# Patient Record
Sex: FEMALE | Race: BLACK OR AFRICAN AMERICAN | URBAN - METROPOLITAN AREA
[De-identification: names, ages, dates, MRNs, and addresses within clinical notes are randomized per-mention and may not be internally consistent; named-entity substitution may affect disease eponyms.]

---

## 2017-04-13 ENCOUNTER — OFFICE VISIT (OUTPATIENT)
Dept: PSYCHOLOGY | Facility: CLINIC | Age: 32
End: 2017-04-13
Payer: COMMERCIAL

## 2017-04-13 DIAGNOSIS — F33.41 RECURRENT MAJOR DEPRESSIVE DISORDER, IN PARTIAL REMISSION (H): ICD-10-CM

## 2017-04-13 DIAGNOSIS — F41.1 GAD (GENERALIZED ANXIETY DISORDER): Primary | ICD-10-CM

## 2017-04-13 PROCEDURE — 90834 PSYTX W PT 45 MINUTES: CPT | Performed by: MARRIAGE & FAMILY THERAPIST

## 2017-04-13 ASSESSMENT — ANXIETY QUESTIONNAIRES
6. BECOMING EASILY ANNOYED OR IRRITABLE: NOT AT ALL
1. FEELING NERVOUS, ANXIOUS, OR ON EDGE: SEVERAL DAYS
5. BEING SO RESTLESS THAT IT IS HARD TO SIT STILL: SEVERAL DAYS
2. NOT BEING ABLE TO STOP OR CONTROL WORRYING: NOT AT ALL
IF YOU CHECKED OFF ANY PROBLEMS ON THIS QUESTIONNAIRE, HOW DIFFICULT HAVE THESE PROBLEMS MADE IT FOR YOU TO DO YOUR WORK, TAKE CARE OF THINGS AT HOME, OR GET ALONG WITH OTHER PEOPLE: NOT DIFFICULT AT ALL
GAD7 TOTAL SCORE: 4
7. FEELING AFRAID AS IF SOMETHING AWFUL MIGHT HAPPEN: NOT AT ALL
3. WORRYING TOO MUCH ABOUT DIFFERENT THINGS: SEVERAL DAYS

## 2017-04-13 ASSESSMENT — PATIENT HEALTH QUESTIONNAIRE - PHQ9: 5. POOR APPETITE OR OVEREATING: SEVERAL DAYS

## 2017-04-13 NOTE — MR AVS SNAPSHOT
"                  MRN:[id number]                      After Visit Summary   2017    Antonieta Ferrari    MRN: [id number]           Visit Information        Provider Department      2017 12:30 PM ModestoquinnRaven cabello [facility] Generic      Your next 10 appointments already scheduled     May 02, 2017 10:30 AM CDT   Return Visit with Ravenchelly [facility] ([facility])    [address]  [facility] [address]   [phone]            May 16, 2017 10:30 AM CDT   Return Visit with Raven [facility] ([facility])    [address]  [facility] [address]   [phone]              MyChart Information     [employer] lets you send messages to your doctor, view your test results, renew your prescriptions, schedule appointments and more. To sign up, go to [email or web address] . Click on \"Log in\" on the left side of the screen, which will take you to the Welcome page. Then click on \"Sign up Now\" on the right side of the page.     You will be asked to enter the access code listed below, as well as some personal information. Please follow the directions to create your username and password.     Your access code is: [email or web address]  Expires: 2017  2:06 PM     Your access code will  in 90 days. If you need help or a new code, please call your [place] clinic or [phone].        Care EveryWhere ID     This is your Care EveryWhere ID. This could be used by other organizations to access your [place] medical records  [id number]        "

## 2017-04-13 NOTE — PROGRESS NOTES
Progress Note    Client Name: Antonieta Ferrari  Date: 4/13/2017         Service Type: Individual      Session Start Time: 4:00  Session End Time: 4:45      Session Length: 45     Session #: 11     Attendees: Client attended alone    Treatment Plan Last Reviewed: 4/13/2017  PHQ-9 / KENNEDY-7 : Each session     DATA      Progress Since Last Session (Related to Symptoms / Goals / Homework):   Symptoms: Stable    Homework: Partially completed      Episode of Care Goals: Satisfactory progress - ACTION (Actively working towards change); Intervened by reinforcing change plan / affirming steps taken     Current / Ongoing Stressors and Concerns:  Client is using pot again. She relapsed when she lost her job and is now smoking daily (at night only). She is lying to her mother and support group about it which makes her feel badly. Lying to herself. 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 May also need knee surgery but is waiting for MRI results. Has not seen parents, is being somewhat social. She had to quit working out which was the one thing that was helping her stay sober.      Treatment Objective(s) Addressed in This Session:   attend and participate in social or recreational activities weekly.   Attend AA 2 times weekly  Exercise regularly  Recommit to sobriety and get a sponsor. Consider outpatient tx program in [place].   Use tools in the evening to avoid using: yoga, hot bath/shower, be social, read, calming things.     Intervention:   Solution Focused: Follow guidelines for after care including AA        ASSESSMENT: Current Emotional / Mental Status (status of significant symptoms):   Risk status (Self / Other harm or suicidal ideation)   Client denies current fears or concerns for personal safety.   Client denies current or recent suicidal ideation or behaviors.   Client  denies current or recent homicidal ideation or behaviors.   Client denies current or recent self injurious behavior or ideation.   Client denies other safety concerns.   A safety and risk management plan has not been developed at this time, however client was given the after-hours number should there be a change in any of these risk factors.     Appearance:   Appropriate    Eye Contact:   Fair    Psychomotor Behavior: Normal    Attitude:   Cooperative    Orientation:   All   Speech    Rate / Production: Normal     Volume:  Normal    Mood:    Normal   Affect:    Appropriate    Thought Content:  Clear    Thought Form:  Coherent  Logical    Insight:    Good      Medication Review:   No current psychiatric medications prescribed      Medication Compliance:   NA     Changes in Health Issues:   None reported     Chemical Use Review:   Substance Use: Chemical use reviewed, no active concerns identified      Tobacco Use: No current tobacco use.       Collateral Reports Completed:   Not Applicable    PLAN: Recommit to sobriety and get a sponsor. Consider outpatient tx program in [place]. Use tools in the evening to avoid using: yoga, hot bath/shower, be social, read, calming things. Continue job search and follow up with MD on knee problem. Exercise in the meantime as best she can.      Raven Colunga                                                         ________________________________________________________________________    Treatment Plan    Client's Name: Antonieta Ferrari  YOB: 1985    Date: 4/13/2017    DSM-V Diagnoses: 296.32 Major Depressive Disorder, Recurrent Episode, Moderate _ and With anxious distress or 300.02 (F41.1) Generalized Anxiety Disorder  Psychosocial / Contextual Factors: Cannibis dependence recovery  WHODAS: 30    Referral / Collaboration:  Referral to another professional/service is not indicated at this time.     Anticipated number of session or this episode of care:  10      MeasurableTreatment Goal(s) related to diagnosis / functional impairment(s)  Goal 1: Client will lower her GAD7 and PHQ9 scores to 4 or below    I will know I've met my goal when I'm not smoking pot daily.      Objective #A (Client Action)    Client will follow treatment after care guidelines.  Status: Restarted - Date: 4/13/2017     Intervention(s)  Therapist will make referrals to Outpatient CD Tx and encourage Antonieta to get and use a sponsor.    Objective #B  Client will stay sober.  Status: Restarted - Date: 4/13/2017     Intervention(s)  Therapist will encourage sobriety..    Objective #C  Client will be social on a regular basis..  Status: New - Date: 4/13/2017    Intervention(s)  Therapist will encourage Antonieta to work on increasing support..        Client has reviewed and agreed to the above plan.      Raven Colunga  April 13, 2017

## 2017-04-14 ASSESSMENT — ANXIETY QUESTIONNAIRES: GAD7 TOTAL SCORE: 4

## 2017-04-14 ASSESSMENT — PATIENT HEALTH QUESTIONNAIRE - PHQ9: SUM OF ALL RESPONSES TO PHQ QUESTIONS 1-9: 7

## 2017-05-02 ENCOUNTER — OFFICE VISIT (OUTPATIENT)
Dept: PSYCHOLOGY | Facility: CLINIC | Age: 32
End: 2017-05-02
Payer: COMMERCIAL

## 2017-05-02 DIAGNOSIS — F41.1 GAD (GENERALIZED ANXIETY DISORDER): Primary | ICD-10-CM

## 2017-05-02 DIAGNOSIS — F33.41 RECURRENT MAJOR DEPRESSIVE DISORDER, IN PARTIAL REMISSION (H): ICD-10-CM

## 2017-05-02 PROCEDURE — 90834 PSYTX W PT 45 MINUTES: CPT | Performed by: MARRIAGE & FAMILY THERAPIST

## 2017-05-02 ASSESSMENT — ANXIETY QUESTIONNAIRES
1. FEELING NERVOUS, ANXIOUS, OR ON EDGE: NOT AT ALL
2. NOT BEING ABLE TO STOP OR CONTROL WORRYING: SEVERAL DAYS
6. BECOMING EASILY ANNOYED OR IRRITABLE: SEVERAL DAYS
7. FEELING AFRAID AS IF SOMETHING AWFUL MIGHT HAPPEN: NOT AT ALL
GAD7 TOTAL SCORE: 4
3. WORRYING TOO MUCH ABOUT DIFFERENT THINGS: NOT AT ALL
IF YOU CHECKED OFF ANY PROBLEMS ON THIS QUESTIONNAIRE, HOW DIFFICULT HAVE THESE PROBLEMS MADE IT FOR YOU TO DO YOUR WORK, TAKE CARE OF THINGS AT HOME, OR GET ALONG WITH OTHER PEOPLE: SOMEWHAT DIFFICULT
5. BEING SO RESTLESS THAT IT IS HARD TO SIT STILL: SEVERAL DAYS

## 2017-05-02 ASSESSMENT — PATIENT HEALTH QUESTIONNAIRE - PHQ9: 5. POOR APPETITE OR OVEREATING: SEVERAL DAYS

## 2017-05-02 NOTE — MR AVS SNAPSHOT
"                  MRN:[id number]                      After Visit Summary   2017    Antonieta Ferrari    MRN: [id number]           Visit Information        Provider Department      2017 10:30 AM Keanu Raven [facility] Generic      Your next 10 appointments already scheduled     May 16, 2017 10:30 AM CDT   Return Visit with Raven Colunga   [facility] ([facility])    [address]  [facility] [address]   [phone]              MyChart Information     "[employer]hart lets you send messages to your doctor, view your test results, renew your prescriptions, schedule appointments and more. To sign up, go to [email or web address] . Click on \"Log in\" on the left side of the screen, which will take you to the Welcome page. Then click on \"Sign up Now\" on the right side of the page.     You will be asked to enter the access code listed below, as well as some personal information. Please follow the directions to create your username and password.     Your access code is: [email or web address]  Expires: 2017  2:06 PM     Your access code will  in 90 days. If you need help or a new code, please call your [place] clinic or [phone].        Care EveryWhere ID     This is your Care EveryWhere ID. This could be used by other organizations to access your [place] medical records  [id number]        "

## 2017-05-02 NOTE — PROGRESS NOTES
Progress Note    Client Name: Antonieta Ferrari  Date: 5/2/2017         Service Type: Individual      Session Start Time: 4:00  Session End Time: 4:45      Session Length: 45     Session #: 12     Attendees: Client attended alone    Treatment Plan Last Reviewed: 4/13/2017  PHQ-9 / KENNEDY-7 : Each session     DATA      Progress Since Last Session (Related to Symptoms / Goals / Homework):   Symptoms: Stable    Homework: Partially completed      Episode of Care Goals: Satisfactory progress - ACTION (Actively working towards change); Intervened by reinforcing change plan / affirming steps taken     Current / Ongoing Stressors and Concerns:  Client continuing to use pot but says not as much and once a day but not always. Trying to use alternate behaviors to cope. Wants to quit and knows she can. Also having financial issues so she knows she cannot afford it. Talked a lot about lack of relationships in client's life. Her family is not there for her and she doesn't have a relationship or spend time with friends. Says she doesn't ever plan to have a boyfriend again. Seems resigned to this. Encouraged Antonieta to at least try to spend more time with people in her life. Not healthy for her to be alone so much. Still looking for work. Needs to have another knee surgery.       Treatment Objective(s) Addressed in This Session:   attend and participate in social or recreational activities weekly.   Attend AA 2 times weekly  Exercise regularly  Recommit to sobriety and get a sponsor. Consider outpatient tx program in [place].   Use tools in the evening to avoid using: yoga, hot bath/shower, be social, read, calming things.     Intervention:   Solution Focused: Follow guidelines for after care including AA        ASSESSMENT: Current Emotional / Mental Status (status of significant symptoms):   Risk status (Self / Other harm or suicidal ideation)   Client denies current fears or concerns for  personal safety.   Client denies current or recent suicidal ideation or behaviors.   Client denies current or recent homicidal ideation or behaviors.   Client denies current or recent self injurious behavior or ideation.   Client denies other safety concerns.   A safety and risk management plan has not been developed at this time, however client was given the after-hours number should there be a change in any of these risk factors.     Appearance:   Appropriate    Eye Contact:   Fair    Psychomotor Behavior: Normal    Attitude:   Cooperative    Orientation:   All   Speech    Rate / Production: Normal     Volume:  Normal    Mood:    Normal   Affect:    Appropriate    Thought Content:  Clear    Thought Form:  Coherent  Logical    Insight:    Good      Medication Review:   No current psychiatric medications prescribed      Medication Compliance:   NA     Changes in Health Issues:   None reported     Chemical Use Review:   Substance Use: Chemical use reviewed, no active concerns identified      Tobacco Use: No current tobacco use.       Collateral Reports Completed:   Not Applicable    PLAN: Use tools in the evening to avoid using: yoga, hot bath/shower, be social, read, calming things. Continue job search. Have surgery June 5. Seek out social outlets.      [facility]                                                         ________________________________________________________________________    Treatment Plan    Client's Name: Antonieta Ferrari  YOB: 1985    Date: 4/13/2017    DSM-V Diagnoses: 296.32 Major Depressive Disorder, Recurrent Episode, Moderate _ and With anxious distress or 300.02 (F41.1) Generalized Anxiety Disorder  Psychosocial / Contextual Factors: Cannibis dependence recovery  WHODAS: 30    Referral / Collaboration:  Referral to another professional/service is not indicated at this time.     Anticipated number of session or this episode of care: 10      MeasurableTreatment Goal(s)  related to diagnosis / functional impairment(s)  Goal 1: Client will lower her GAD7 and PHQ9 scores to 4 or below    I will know I've met my goal when I'm not smoking pot daily.      Objective #A (Client Action)    Client will follow treatment after care guidelines.  Status: Restarted - Date: 4/13/2017     Intervention(s)  Therapist will make referrals to Outpatient CD Tx and encourage Antonieta to get and use a sponsor.    Objective #B  Client will stay sober.  Status: Restarted - Date: 4/13/2017     Intervention(s)  Therapist will encourage sobriety..    Objective #C  Client will be social on a regular basis..  Status: New - Date: 4/13/2017    Intervention(s)  Therapist will encourage Antonieta to work on increasing support..        Client has reviewed and agreed to the above plan.      Raven Colunga  April 13, 2017

## 2017-05-03 ASSESSMENT — ANXIETY QUESTIONNAIRES: GAD7 TOTAL SCORE: 4

## 2017-05-03 ASSESSMENT — PATIENT HEALTH QUESTIONNAIRE - PHQ9: SUM OF ALL RESPONSES TO PHQ QUESTIONS 1-9: 7

## 2017-05-16 ENCOUNTER — OFFICE VISIT (OUTPATIENT)
Dept: PSYCHOLOGY | Facility: CLINIC | Age: 32
End: 2017-05-16
Payer: COMMERCIAL

## 2017-05-16 DIAGNOSIS — F41.1 GAD (GENERALIZED ANXIETY DISORDER): Primary | ICD-10-CM

## 2017-05-16 DIAGNOSIS — F33.41 RECURRENT MAJOR DEPRESSIVE DISORDER, IN PARTIAL REMISSION (H): ICD-10-CM

## 2017-05-16 PROCEDURE — 90834 PSYTX W PT 45 MINUTES: CPT | Performed by: MARRIAGE & FAMILY THERAPIST

## 2017-05-16 ASSESSMENT — ANXIETY QUESTIONNAIRES
5. BEING SO RESTLESS THAT IT IS HARD TO SIT STILL: NOT AT ALL
GAD7 TOTAL SCORE: 2
7. FEELING AFRAID AS IF SOMETHING AWFUL MIGHT HAPPEN: NOT AT ALL
6. BECOMING EASILY ANNOYED OR IRRITABLE: SEVERAL DAYS
1. FEELING NERVOUS, ANXIOUS, OR ON EDGE: NOT AT ALL
2. NOT BEING ABLE TO STOP OR CONTROL WORRYING: NOT AT ALL
3. WORRYING TOO MUCH ABOUT DIFFERENT THINGS: SEVERAL DAYS
IF YOU CHECKED OFF ANY PROBLEMS ON THIS QUESTIONNAIRE, HOW DIFFICULT HAVE THESE PROBLEMS MADE IT FOR YOU TO DO YOUR WORK, TAKE CARE OF THINGS AT HOME, OR GET ALONG WITH OTHER PEOPLE: SOMEWHAT DIFFICULT

## 2017-05-16 ASSESSMENT — PATIENT HEALTH QUESTIONNAIRE - PHQ9: 5. POOR APPETITE OR OVEREATING: NOT AT ALL

## 2017-05-16 NOTE — PROGRESS NOTES
Progress Note    Client Name: Antonieta Ferrari  Date: 5/16/2017         Service Type: Individual      Session Start Time: 4:00  Session End Time: 4:45      Session Length: 45     Session #: 13     Attendees: Client attended alone    Treatment Plan Last Reviewed: 4/13/2017  PHQ-9 / KENNEDY-7 : Each session     DATA      Progress Since Last Session (Related to Symptoms / Goals / Homework):   Symptoms: Stable    Homework: Partially completed      Episode of Care Goals: Minimal progress - PREPARATION (Decided to change - considering how); Intervened by negotiating a change plan and determining options / strategies for behavior change, identifying triggers, exploring social supports, and working towards setting a date to begin behavior change     Current / Ongoing Stressors and Concerns:  Client continuing to use pot. Seems to be losing motivation to quit. Trying to limit her use to bedtime. Still looking for employment. Concerns about not finding FT work. Has her surgery in June. Also dropped 2 friends since we met last. Recognizes she needs to do things to increase support not decrease it but maybe letting go of unhealthy relationships can be good.    Talked about a mood stabilizer as she fears becoming depressed in the fall.     Treatment Objective(s) Addressed in This Session:   attend and participate in social or recreational activities weekly.   Attend AA 2 times weekly  Exercise regularly  Recommit to sobriety and get a sponsor. Consider outpatient tx program in [place].   Use tools in the evening to avoid using: yoga, hot bath/shower, be social, read, calming things.     Intervention:   Solution Focused: Follow guidelines for after care including AA        ASSESSMENT: Current Emotional / Mental Status (status of significant symptoms):   Risk status (Self / Other harm or suicidal ideation)   Client denies current fears or concerns for personal safety.   Client denies current or  recent suicidal ideation or behaviors.   Client denies current or recent homicidal ideation or behaviors.   Client denies current or recent self injurious behavior or ideation.   Client denies other safety concerns.   A safety and risk management plan has not been developed at this time, however client was given the after-hours number should there be a change in any of these risk factors.     Appearance:   Appropriate    Eye Contact:   Fair    Psychomotor Behavior: Normal    Attitude:   Cooperative    Orientation:   All   Speech    Rate / Production: Normal     Volume:  Normal    Mood:    Normal   Affect:    Appropriate    Thought Content:  Clear    Thought Form:  Coherent  Logical    Insight:    Good      Medication Review:   No current psychiatric medications prescribed      Medication Compliance:   NA     Changes in Health Issues:   None reported     Chemical Use Review:   Substance Use: Chemical use reviewed, no active concerns identified      Tobacco Use: No current tobacco use.       Collateral Reports Completed:   Not Applicable    PLAN: Same goals: Use tools in the evening to avoid using: yoga, hot bath/shower, be social, read, calming things. Continue job search. Have surgery June 5. Seek out social outlets.      Raven Miller [facility]                                                         ________________________________________________________________________    Treatment Plan    Client's Name: Antonieta Ferrari  YOB: 1985    Date: 4/13/2017    DSM-V Diagnoses: 296.32 Major Depressive Disorder, Recurrent Episode, Moderate _ and With anxious distress or 300.02 (F41.1) Generalized Anxiety Disorder  Psychosocial / Contextual Factors: Cannibis dependence recovery  WHODAS: 30    Referral / Collaboration:  Referral to another professional/service is not indicated at this time.     Anticipated number of session or this episode of care: 10      MeasurableTreatment Goal(s) related to diagnosis /  functional impairment(s)  Goal 1: Client will lower her GAD7 and PHQ9 scores to 4 or below    I will know I've met my goal when I'm not smoking pot daily.      Objective #A (Client Action)    Client will follow treatment after care guidelines.  Status: Restarted - Date: 4/13/2017     Intervention(s)  Therapist will make referrals to Outpatient CD Tx and encourage Antonieta to get and use a sponsor.    Objective #B  Client will stay sober.  Status: Restarted - Date: 4/13/2017     Intervention(s)  Therapist will encourage sobriety..    Objective #C  Client will be social on a regular basis..  Status: New - Date: 4/13/2017    Intervention(s)  Therapist will encourage Antonieta to work on increasing support..        Client has reviewed and agreed to the above plan.      Raven Colunga  April 13, 2017

## 2017-05-16 NOTE — MR AVS SNAPSHOT
"                  MRN:[id number]                      After Visit Summary   2017    Antonieta Ferrari    MRN: [id number]           Visit Information        Provider Department      2017 10:30 AM QuiqueRaven cabello [facility] Generic      Your next 10 appointments already scheduled     2017  1:30 PM CDT   Return Visit with Raven [facility] ([facility])    [address]  [facility] [address]   [phone]            2017  1:00 PM CDT   Return Visit with Raven [facility] ([facility])    [address]  [facility] [address]   [phone]              MyChart Information     [employer] lets you send messages to your doctor, view your test results, renew your prescriptions, schedule appointments and more. To sign up, go to [email or web address] . Click on \"Log in\" on the left side of the screen, which will take you to the Welcome page. Then click on \"Sign up Now\" on the right side of the page.     You will be asked to enter the access code listed below, as well as some personal information. Please follow the directions to create your username and password.     Your access code is: [email or web address]  Expires: 2017  2:06 PM     Your access code will  in 90 days. If you need help or a new code, please call your [place] clinic or [phone].        Care EveryWhere ID     This is your Care EveryWhere ID. This could be used by other organizations to access your [place] medical records  [id number]        "

## 2017-05-17 ASSESSMENT — ANXIETY QUESTIONNAIRES: GAD7 TOTAL SCORE: 2

## 2017-05-17 ASSESSMENT — PATIENT HEALTH QUESTIONNAIRE - PHQ9: SUM OF ALL RESPONSES TO PHQ QUESTIONS 1-9: 8

## 2017-06-08 ENCOUNTER — OFFICE VISIT (OUTPATIENT)
Dept: PSYCHOLOGY | Facility: CLINIC | Age: 32
End: 2017-06-08
Payer: COMMERCIAL

## 2017-06-08 DIAGNOSIS — F33.41 RECURRENT MAJOR DEPRESSIVE DISORDER, IN PARTIAL REMISSION (H): ICD-10-CM

## 2017-06-08 DIAGNOSIS — F41.1 GAD (GENERALIZED ANXIETY DISORDER): Primary | ICD-10-CM

## 2017-06-08 PROCEDURE — 90834 PSYTX W PT 45 MINUTES: CPT | Performed by: MARRIAGE & FAMILY THERAPIST

## 2017-06-08 ASSESSMENT — ANXIETY QUESTIONNAIRES
5. BEING SO RESTLESS THAT IT IS HARD TO SIT STILL: SEVERAL DAYS
6. BECOMING EASILY ANNOYED OR IRRITABLE: NOT AT ALL
1. FEELING NERVOUS, ANXIOUS, OR ON EDGE: SEVERAL DAYS
3. WORRYING TOO MUCH ABOUT DIFFERENT THINGS: SEVERAL DAYS
GAD7 TOTAL SCORE: 3
2. NOT BEING ABLE TO STOP OR CONTROL WORRYING: NOT AT ALL
IF YOU CHECKED OFF ANY PROBLEMS ON THIS QUESTIONNAIRE, HOW DIFFICULT HAVE THESE PROBLEMS MADE IT FOR YOU TO DO YOUR WORK, TAKE CARE OF THINGS AT HOME, OR GET ALONG WITH OTHER PEOPLE: SOMEWHAT DIFFICULT
7. FEELING AFRAID AS IF SOMETHING AWFUL MIGHT HAPPEN: NOT AT ALL

## 2017-06-08 ASSESSMENT — PATIENT HEALTH QUESTIONNAIRE - PHQ9: 5. POOR APPETITE OR OVEREATING: NOT AT ALL

## 2017-06-08 NOTE — MR AVS SNAPSHOT
"                  MRN:[id number]                      After Visit Summary   2017    Antonieta Ferrari    MRN: [id number]           Visit Information        Provider Department      2017 2:00 PM Keanu Raven [facility] Generic      Your next 10 appointments already scheduled     2017  1:00 PM CDT   Return Visit with Raven Colunga   [facility] ([facility])    [address]  [facility] [address]   [phone]              MyChart Information     [employer] lets you send messages to your doctor, view your test results, renew your prescriptions, schedule appointments and more. To sign up, go to [email or web address] . Click on \"Log in\" on the left side of the screen, which will take you to the Welcome page. Then click on \"Sign up Now\" on the right side of the page.     You will be asked to enter the access code listed below, as well as some personal information. Please follow the directions to create your username and password.     Your access code is: [email or web address]  Expires: 2017  2:06 PM     Your access code will  in 90 days. If you need help or a new code, please call your [place] clinic or [phone].        Care EveryWhere ID     This is your Care EveryWhere ID. This could be used by other organizations to access your [place] medical records  [id number]        "

## 2017-06-08 NOTE — PROGRESS NOTES
"                                             Progress Note    Client Name: Antonieta Ferrari  Date: 6/8/2017         Service Type: Individual      Session Start Time: 4:00  Session End Time: 4:45      Session Length: 45     Session #: 14     Attendees: Client attended alone    Treatment Plan Last Reviewed: 4/13/2017  PHQ-9 / KENNEDY-7 : Each session     DATA      Progress Since Last Session (Related to Symptoms / Goals / Homework):   Symptoms: Stable    Homework: Partially completed      Episode of Care Goals: Minimal progress - PREPARATION (Decided to change - considering how); Intervened by negotiating a change plan and determining options / strategies for behavior change, identifying triggers, exploring social supports, and working towards setting a date to begin behavior change     Current / Ongoing Stressors and Concerns:  Client recovering well from knee surgery. Thinks it went well but will likely have arthritis in the future. Still smoking with increased use. Thinks her tolerance is increasing. Frustrated with herself. Says she needs to find outlets to make friends. Talked about Voodoo, meet up groups, and volunteering as well as AA. Always circles back to her needing to quit smoking. Still job hunting. Track is over. Had a conflict with her mother/parents and has decided to not reach out to them for now. Is \"cleaning house\" and only spending time with people who care about her and want a relationship with her. Also seeing a Psychiatrist soon.     Treatment Objective(s) Addressed in This Session:   attend and participate in social or recreational activities weekly.   Attend AA 2 times weekly  Exercise regularly  Recommit to sobriety and get a sponsor. Consider outpatient tx program in [place].   Use tools in the evening to avoid using: yoga, hot bath/shower, be social, read, calming things.     Intervention:   Solution Focused: Follow guidelines for after care including AA        ASSESSMENT: Current Emotional / " Mental Status (status of significant symptoms):   Risk status (Self / Other harm or suicidal ideation)   Client denies current fears or concerns for personal safety.   Client denies current or recent suicidal ideation or behaviors.   Client denies current or recent homicidal ideation or behaviors.   Client denies current or recent self injurious behavior or ideation.   Client denies other safety concerns.   A safety and risk management plan has not been developed at this time, however client was given the after-hours number should there be a change in any of these risk factors.     Appearance:   Appropriate    Eye Contact:   Fair    Psychomotor Behavior: Normal    Attitude:   Cooperative    Orientation:   All   Speech    Rate / Production: Normal     Volume:  Normal    Mood:    Normal   Affect:    Appropriate    Thought Content:  Clear    Thought Form:  Coherent  Logical    Insight:    Good      Medication Review:   No current psychiatric medications prescribed      Medication Compliance:   NA     Changes in Health Issues:   None reported     Chemical Use Review:   Substance Use: Chemical use reviewed, no active concerns identified      Tobacco Use: No current tobacco use.       Collateral Reports Completed:   Not Applicable    PLAN: Same goals: Use tools in the evening to avoid using: yoga, hot bath/shower, be social, read, calming things. Continue job search. Seek out social outlets.      Raven Colunga                                                         ________________________________________________________________________    Treatment Plan    Client's Name: Antonieta Ferrari  YOB: 1985    Date: 4/13/2017    DSM-V Diagnoses: 296.32 Major Depressive Disorder, Recurrent Episode, Moderate _ and With anxious distress or 300.02 (F41.1) Generalized Anxiety Disorder  Psychosocial / Contextual Factors: Cannibis dependence recovery  WHODAS: 30    Referral / Collaboration:  Referral to another  professional/service is not indicated at this time.     Anticipated number of session or this episode of care: 10      MeasurableTreatment Goal(s) related to diagnosis / functional impairment(s)  Goal 1: Client will lower her GAD7 and PHQ9 scores to 4 or below    I will know I've met my goal when I'm not smoking pot daily.      Objective #A (Client Action)    Client will follow treatment after care guidelines.  Status: Restarted - Date: 4/13/2017     Intervention(s)  Therapist will make referrals to Outpatient CD Tx and encourage Antonieta to get and use a sponsor.    Objective #B  Client will stay sober.  Status: Restarted - Date: 4/13/2017     Intervention(s)  Therapist will encourage sobriety..    Objective #C  Client will be social on a regular basis..  Status: New - Date: 4/13/2017    Intervention(s)  Therapist will encourage Antonieta to work on increasing support..        Client has reviewed and agreed to the above plan.      Raven Colunga  April 13, 2017

## 2017-06-09 ASSESSMENT — PATIENT HEALTH QUESTIONNAIRE - PHQ9: SUM OF ALL RESPONSES TO PHQ QUESTIONS 1-9: 6

## 2017-06-09 ASSESSMENT — ANXIETY QUESTIONNAIRES: GAD7 TOTAL SCORE: 3

## 2017-06-27 ENCOUNTER — OFFICE VISIT (OUTPATIENT)
Dept: PSYCHOLOGY | Facility: CLINIC | Age: 32
End: 2017-06-27
Payer: COMMERCIAL

## 2017-06-27 DIAGNOSIS — F41.1 GAD (GENERALIZED ANXIETY DISORDER): Primary | ICD-10-CM

## 2017-06-27 DIAGNOSIS — F32.A DEPRESSION: ICD-10-CM

## 2017-06-27 PROCEDURE — 90834 PSYTX W PT 45 MINUTES: CPT | Performed by: MARRIAGE & FAMILY THERAPIST

## 2017-06-27 NOTE — MR AVS SNAPSHOT
"                  MRN:[id number]                      After Visit Summary   2017    Antonieta Ferrari    MRN: [id number]           Visit Information        Provider Department      2017 1:00 PM [place] cabello [facility] Generic      Your next 10 appointments already scheduled     2017  1:00 PM CDT   Return Visit with Raven Colunga   [facility] ([facility])    [address]  [facility] [address]   [phone]              MyChart Information     [employer] lets you send messages to your doctor, view your test results, renew your prescriptions, schedule appointments and more. To sign up, go to [email or web address] . Click on \"Log in\" on the left side of the screen, which will take you to the Welcome page. Then click on \"Sign up Now\" on the right side of the page.     You will be asked to enter the access code listed below, as well as some personal information. Please follow the directions to create your username and password.     Your access code is: [email or web address]  Expires: 2017  2:06 PM     Your access code will  in 90 days. If you need help or a new code, please call your [place] clinic or [phone].        Care EveryWhere ID     This is your Care EveryWhere ID. This could be used by other organizations to access your [place] medical records  [id number]        Equal Access to Services     HUONG GARCIA AH: Hadii alyssa santillano Soankit, waaxda luqadaha, qaybta kaalmada adeegyada, jamarcus scruggs. So [facility] [phone].    ATENCIÓN: Si habla español, tiene a maciel disposición servicios gratuitos de asistencia lingüística. Llame al [phone].    We comply with applicable federal civil rights laws and [place] laws. We do not discriminate on the basis of race, color, national origin, age, disability sex, sexual orientation or gender identity.            "

## 2017-07-11 ENCOUNTER — OFFICE VISIT (OUTPATIENT)
Dept: PSYCHOLOGY | Facility: CLINIC | Age: 32
End: 2017-07-11
Payer: COMMERCIAL

## 2017-07-11 DIAGNOSIS — F33.41 RECURRENT MAJOR DEPRESSIVE DISORDER, IN PARTIAL REMISSION (H): ICD-10-CM

## 2017-07-11 DIAGNOSIS — F41.1 GAD (GENERALIZED ANXIETY DISORDER): Primary | ICD-10-CM

## 2017-07-11 PROCEDURE — 90834 PSYTX W PT 45 MINUTES: CPT | Performed by: MARRIAGE & FAMILY THERAPIST

## 2017-07-11 ASSESSMENT — ANXIETY QUESTIONNAIRES
3. WORRYING TOO MUCH ABOUT DIFFERENT THINGS: SEVERAL DAYS
7. FEELING AFRAID AS IF SOMETHING AWFUL MIGHT HAPPEN: SEVERAL DAYS
1. FEELING NERVOUS, ANXIOUS, OR ON EDGE: NOT AT ALL
6. BECOMING EASILY ANNOYED OR IRRITABLE: NOT AT ALL
5. BEING SO RESTLESS THAT IT IS HARD TO SIT STILL: NOT AT ALL
2. NOT BEING ABLE TO STOP OR CONTROL WORRYING: NOT AT ALL
IF YOU CHECKED OFF ANY PROBLEMS ON THIS QUESTIONNAIRE, HOW DIFFICULT HAVE THESE PROBLEMS MADE IT FOR YOU TO DO YOUR WORK, TAKE CARE OF THINGS AT HOME, OR GET ALONG WITH OTHER PEOPLE: NOT DIFFICULT AT ALL
GAD7 TOTAL SCORE: 3

## 2017-07-11 ASSESSMENT — PATIENT HEALTH QUESTIONNAIRE - PHQ9: 5. POOR APPETITE OR OVEREATING: SEVERAL DAYS

## 2017-07-11 NOTE — PROGRESS NOTES
Progress Note    Client Name: Antonieta Ferrari  Date: 7/11/2017         Service Type: Individual      Session Start Time: 4:00  Session End Time: 4:45      Session Length: 45     Session #: 16     Attendees: Client attended alone    Treatment Plan Last Reviewed: 4/13/2017  PHQ-9 / KENNEDY-7 : Each session     DATA      Progress Since Last Session (Related to Symptoms / Goals / Homework):   Symptoms: Stable    Homework: Partially completed      Episode of Care Goals: Minimal progress - PREPARATION (Decided to change - considering how); Intervened by negotiating a change plan and determining options / strategies for behavior change, identifying triggers, exploring social supports, and working towards setting a date to begin behavior change     Current / Ongoing Stressors and Concerns:  Client still looking for work but has some leads. Thinks the lamictal is helping. Not as edgy, cummings or depressed. Will increase end of week.      Treatment Objective(s) Addressed in This Session:   attend and participate in social or recreational activities weekly.   Use tools in the evening to avoid using: yoga, hot bath/shower, be social, read, calming things.  Consider treatment program.  Med compliance.     Intervention:   Solution Focused: Follow guidelines for after care including AA        ASSESSMENT: Current Emotional / Mental Status (status of significant symptoms):   Risk status (Self / Other harm or suicidal ideation)   Client denies current fears or concerns for personal safety.   Client denies current or recent suicidal ideation or behaviors.   Client denies current or recent homicidal ideation or behaviors.   Client denies current or recent self injurious behavior or ideation.   Client denies other safety concerns.   A safety and risk management plan has not been developed at this time, however client was given the after-hours number should there be a change in any of these risk  factors.     Appearance:   Appropriate    Eye Contact:   Fair    Psychomotor Behavior: Normal    Attitude:   Cooperative    Orientation:   All   Speech    Rate / Production: Normal     Volume:  Normal    Mood:    Normal   Affect:    Appropriate    Thought Content:  Clear    Thought Form:  Coherent  Logical    Insight:    Good      Medication Review:   No changes to current psychiatric medication(s) Taking lamictal. Uses seroquel for sleep sometimes.     Medication Compliance:   Yes     Changes in Health Issues:   None reported     Chemical Use Review:   Substance Use: Chemical use reviewed, no active concerns identified      Tobacco Use: No current tobacco use.       Collateral Reports Completed:   Not Applicable    PLAN: Same goals:   Continue to reduce use and exercise as best she can.  Use tools in the evening to avoid using: yoga, hot bath/shower, be social, read, calming things. Continue job search. Seek out social outlets.      Raven Colunga                                                         ________________________________________________________________________    Treatment Plan    Client's Name: Antonieta Ferrari  YOB: 1985    Date: 4/13/2017    DSM-V Diagnoses: 296.32 Major Depressive Disorder, Recurrent Episode, Moderate _ and With anxious distress or 300.02 (F41.1) Generalized Anxiety Disorder  Psychosocial / Contextual Factors: Cannibis dependence recovery  WHODAS: 30    Referral / Collaboration:  Referral to another professional/service is not indicated at this time.     Anticipated number of session or this episode of care: 10      MeasurableTreatment Goal(s) related to diagnosis / functional impairment(s)  Goal 1: Client will lower her GAD7 and PHQ9 scores to 4 or below    I will know I've met my goal when I'm not smoking pot daily.      Objective #A (Client Action)    Client will follow treatment after care guidelines.  Status: Restarted - Date: 4/13/2017      Intervention(s)  Therapist will make referrals to Outpatient CD Tx and encourage Antonieta to get and use a sponsor.    Objective #B  Client will stay sober.  Status: Restarted - Date: 4/13/2017     Intervention(s)  Therapist will encourage sobriety..    Objective #C  Client will be social on a regular basis..  Status: New - Date: 4/13/2017    Intervention(s)  Therapist will encourage Antonieta to work on increasing support..        Client has reviewed and agreed to the above plan.      Raven Colunga  April 13, 2017

## 2017-07-11 NOTE — MR AVS SNAPSHOT
"                  MRN:[id number]                      After Visit Summary   2017    Antonieta Ferrari    MRN: [id number]           Visit Information        Provider Department      2017 1:00 PM [place] Raven [facility] Generic      Your next 10 appointments already scheduled     2017 12:00 PM CDT   Return Visit with Raven Colunga   [facility] ([facility])    [address]  [facility] [address]   [phone]              MyChart Information     [employer] lets you send messages to your doctor, view your test results, renew your prescriptions, schedule appointments and more. To sign up, go to [email or web address]" . Click on \"Log in\" on the left side of the screen, which will take you to the Welcome page. Then click on \"Sign up Now\" on the right side of the page.     You will be asked to enter the access code listed below, as well as some personal information. Please follow the directions to create your username and password.     Your access code is: [email or web address]  Expires: 2017  2:06 PM     Your access code will  in 90 days. If you need help or a new code, please call your [place] clinic or [phone].        Care EveryWhere ID     This is your Care EveryWhere ID. This could be used by other organizations to access your [place] medical records  [id number]        Equal Access to Services     HUONG GARCIA AH: Hadii alyssa santillano Soankit, waaxda luqadaha, qaybta kaalmada adeegyada, jamarcus scruggs. So [facility] [phone].    ATENCIÓN: Si habla español, tiene a maciel disposición servicios gratuitos de asistencia lingüística. Llame al [phone].    We comply with applicable federal civil rights laws and [place] laws. We do not discriminate on the basis of race, color, national origin, age, disability sex, sexual orientation or gender identity.            "

## 2017-07-12 ASSESSMENT — PATIENT HEALTH QUESTIONNAIRE - PHQ9: SUM OF ALL RESPONSES TO PHQ QUESTIONS 1-9: 3

## 2017-07-12 ASSESSMENT — ANXIETY QUESTIONNAIRES: GAD7 TOTAL SCORE: 3

## 2017-07-27 ENCOUNTER — OFFICE VISIT (OUTPATIENT)
Dept: PSYCHOLOGY | Facility: CLINIC | Age: 32
End: 2017-07-27
Payer: COMMERCIAL

## 2017-07-27 DIAGNOSIS — F31.9 BIPOLAR 1 DISORDER (H): ICD-10-CM

## 2017-07-27 DIAGNOSIS — F41.1 GAD (GENERALIZED ANXIETY DISORDER): Primary | ICD-10-CM

## 2017-07-27 PROCEDURE — 90834 PSYTX W PT 45 MINUTES: CPT | Performed by: MARRIAGE & FAMILY THERAPIST

## 2017-07-27 ASSESSMENT — ANXIETY QUESTIONNAIRES
3. WORRYING TOO MUCH ABOUT DIFFERENT THINGS: SEVERAL DAYS
GAD7 TOTAL SCORE: 2
7. FEELING AFRAID AS IF SOMETHING AWFUL MIGHT HAPPEN: NOT AT ALL
IF YOU CHECKED OFF ANY PROBLEMS ON THIS QUESTIONNAIRE, HOW DIFFICULT HAVE THESE PROBLEMS MADE IT FOR YOU TO DO YOUR WORK, TAKE CARE OF THINGS AT HOME, OR GET ALONG WITH OTHER PEOPLE: NOT DIFFICULT AT ALL
5. BEING SO RESTLESS THAT IT IS HARD TO SIT STILL: NOT AT ALL
1. FEELING NERVOUS, ANXIOUS, OR ON EDGE: NOT AT ALL
2. NOT BEING ABLE TO STOP OR CONTROL WORRYING: NOT AT ALL
6. BECOMING EASILY ANNOYED OR IRRITABLE: SEVERAL DAYS

## 2017-07-27 ASSESSMENT — PATIENT HEALTH QUESTIONNAIRE - PHQ9: 5. POOR APPETITE OR OVEREATING: NOT AT ALL

## 2017-07-27 NOTE — MR AVS SNAPSHOT
"                  MRN:[id number]                      After Visit Summary   2017    Antonieta Ferrari    MRN: [id number]           Visit Information        Provider Department      2017 12:00 PM [place] Raven Miller [facility] Generic      Your next 10 appointments already scheduled     Aug 16, 2017 12:00 PM CDT   Return Visit with Raven Colunga   [facility] ([facility])    [address]  [facility] [address]   [phone]              MyChart Information     [employer] lets you send messages to your doctor, view your test results, renew your prescriptions, schedule appointments and more. To sign up, go to [email or web address] . Click on \"Log in\" on the left side of the screen, which will take you to the Welcome page. Then click on \"Sign up Now\" on the right side of the page.     You will be asked to enter the access code listed below, as well as some personal information. Please follow the directions to create your username and password.     Your access code is: [email or web address]  Expires: 10/25/2017  1:01 PM     Your access code will  in 90 days. If you need help or a new code, please call your [place] clinic or [phone].        Care EveryWhere ID     This is your Care EveryWhere ID. This could be used by other organizations to access your [place] medical records  [id number]        Equal Access to Services     LONI GARCIA AH: Hadii alyssa santillano Sojaileneali, waaxda luqadaha, qaybta kaalmada adeegyada, waxcatia sharri montalvo . So [facility] [phone].    ATENCIÓN: Si habla español, tiene a maciel disposición servicios gratuitos de asistencia lingüística. Llame al [phone].    We comply with applicable federal civil rights laws and [place] laws. We do not discriminate on the basis of race, color, national origin, age, disability sex, sexual orientation or gender identity.            "

## 2017-07-27 NOTE — PROGRESS NOTES
Progress Note    Client Name: Antonieta Ferrari  Date: 8/27/2017         Service Type: Individual      Session Start Time: 4:00  Session End Time: 4:45      Session Length: 45     Session #: 17     Attendees: Client attended alone    Treatment Plan Last Reviewed: 8/27/2017  PHQ-9 / KENNEDY-7 : Each session     DATA      Progress Since Last Session (Related to Symptoms / Goals / Homework):   Symptoms: Stable    Homework: Partially completed      Episode of Care Goals: Satisfactory progress - ACTION (Actively working towards change); Intervened by reinforcing change plan / affirming steps taken     Current / Ongoing Stressors and Concerns:  Client still looking for work but has some leads. Thinks the lamictal is definitely helping. Not as edgy, cummings or depressed. Will be at therapeutic dose tomorrow. Also back to exercising and hasn't smoked for 2 days. Wants to be conscientious about not replacing pot with alcohol.       Treatment Objective(s) Addressed in This Session:   attend and participate in social or recreational activities weekly.   Use tools in the evening to avoid using: yoga, hot bath/shower, be social, read, calming things.  Consider treatment program.  Med compliance.     Intervention:   Solution Focused: Follow guidelines for after care including AA        ASSESSMENT: Current Emotional / Mental Status (status of significant symptoms):   Risk status (Self / Other harm or suicidal ideation)   Client denies current fears or concerns for personal safety.   Client denies current or recent suicidal ideation or behaviors.   Client denies current or recent homicidal ideation or behaviors.   Client denies current or recent self injurious behavior or ideation.   Client denies other safety concerns.   A safety and risk management plan has not been developed at this time, however client was given the after-hours number should there be a change in any of these risk  factors.     Appearance:   Appropriate    Eye Contact:   Fair    Psychomotor Behavior: Normal    Attitude:   Cooperative    Orientation:   All   Speech    Rate / Production: Normal     Volume:  Normal    Mood:    Normal   Affect:    Appropriate    Thought Content:  Clear    Thought Form:  Coherent  Logical    Insight:    Good      Medication Review:   No changes to current psychiatric medication(s) Taking lamictal. Uses seroquel for sleep sometimes.     Medication Compliance:   Yes     Changes in Health Issues:   None reported     Chemical Use Review:   Substance Use: Chemical use reviewed, no active concerns identified      Tobacco Use: No current tobacco use.       Collateral Reports Completed:   Not Applicable    PLAN: Same goals:   Continue to reduce use and exercise as best she can. Not interested in dating but still needing social outlets.  Use tools in the evening to avoid using: yoga, hot bath/shower, be social, read, calming things. Continue job search.       Raven Paul Colunga                                                         ________________________________________________________________________    Treatment Plan    Client's Name: Antonieta Ferrari  YOB: 1985    Date: 7/27/2017    DSM-V Diagnoses: 296.32 Major Depressive Disorder, Recurrent Episode, Moderate _ and With anxious distress or 300.02 (F41.1) Generalized Anxiety Disorder  Psychosocial / Contextual Factors: Cannibis dependence recovery  WHODAS: 30    Referral / Collaboration:  Referral to another professional/service is not indicated at this time.     Anticipated number of session or this episode of care: 10      MeasurableTreatment Goal(s) related to diagnosis / functional impairment(s)  Goal 1: Client will lower her GAD7 and PHQ9 scores to 4 or below    I will know I've met my goal when I'm not smoking pot daily.      Objective #A (Client Action)    Client will follow treatment after care guidelines.  Status: Restarted -  Date: 7/27/2017     Intervention(s)  Therapist will encourage Antonieta to get and use a sponsor.    Objective #B  Client will stay sober.  Status: Restarted - Date: 7/27/2017     Intervention(s)  Therapist will encourage sobriety..    Objective #C  Client will be social on a regular basis..  Status: Continued - Date(s):7/27/2017    Intervention(s)  Therapist will encourage Antonieta to work on increasing support..        Client has reviewed and agreed to the above plan.      Raven Colunga  July 27, 2017

## 2017-07-28 ASSESSMENT — ANXIETY QUESTIONNAIRES: GAD7 TOTAL SCORE: 2

## 2017-07-28 ASSESSMENT — PATIENT HEALTH QUESTIONNAIRE - PHQ9: SUM OF ALL RESPONSES TO PHQ QUESTIONS 1-9: 4

## 2017-08-16 ENCOUNTER — OFFICE VISIT (OUTPATIENT)
Dept: PSYCHOLOGY | Facility: CLINIC | Age: 32
End: 2017-08-16
Payer: COMMERCIAL

## 2017-08-16 DIAGNOSIS — F41.1 GAD (GENERALIZED ANXIETY DISORDER): Primary | ICD-10-CM

## 2017-08-16 PROCEDURE — 90834 PSYTX W PT 45 MINUTES: CPT | Performed by: MARRIAGE & FAMILY THERAPIST

## 2017-08-16 ASSESSMENT — ANXIETY QUESTIONNAIRES
5. BEING SO RESTLESS THAT IT IS HARD TO SIT STILL: NOT AT ALL
GAD7 TOTAL SCORE: 2
6. BECOMING EASILY ANNOYED OR IRRITABLE: NOT AT ALL
2. NOT BEING ABLE TO STOP OR CONTROL WORRYING: NOT AT ALL
3. WORRYING TOO MUCH ABOUT DIFFERENT THINGS: SEVERAL DAYS
7. FEELING AFRAID AS IF SOMETHING AWFUL MIGHT HAPPEN: NOT AT ALL
IF YOU CHECKED OFF ANY PROBLEMS ON THIS QUESTIONNAIRE, HOW DIFFICULT HAVE THESE PROBLEMS MADE IT FOR YOU TO DO YOUR WORK, TAKE CARE OF THINGS AT HOME, OR GET ALONG WITH OTHER PEOPLE: NOT DIFFICULT AT ALL
1. FEELING NERVOUS, ANXIOUS, OR ON EDGE: SEVERAL DAYS

## 2017-08-16 ASSESSMENT — PATIENT HEALTH QUESTIONNAIRE - PHQ9
5. POOR APPETITE OR OVEREATING: NOT AT ALL
SUM OF ALL RESPONSES TO PHQ QUESTIONS 1-9: 2

## 2017-08-16 NOTE — MR AVS SNAPSHOT
"                  MRN:[id number]                      After Visit Summary   2017    Antonieta Ferrari    MRN: [id number]           Visit Information        Provider Department      2017 12:00 PM [place]aven cabello [facility] Generic      Your next 10 appointments already scheduled     Sep 20, 2017  6:00 PM CDT   Return Visit with Raven Colunga   [facility] ([facility])    [address]  [facility] [address]   [phone]              MyChart Information     [employer] lets you send messages to your doctor, view your test results, renew your prescriptions, schedule appointments and more. To sign up, go to [email or web address] . Click on \"Log in\" on the left side of the screen, which will take you to the Welcome page. Then click on \"Sign up Now\" on the right side of the page.     You will be asked to enter the access code listed below, as well as some personal information. Please follow the directions to create your username and password.     Your access code is: [email or web address]  Expires: 10/25/2017  1:01 PM     Your access code will  in 90 days. If you need help or a new code, please call your [place] clinic or [phone].        Care EveryWhere ID     This is your Care EveryWhere ID. This could be used by other organizations to access your [place] medical records  [id number]        Equal Access to Services     LONI GARCIA AH: Hadii alyssa santillano Sojaileneali, waaxda luqadaha, qaybta kaalmada adeegyada, jamarcus montalvo . So [facility] [phone].    ATENCIÓN: Si habla español, tiene a maciel disposición servicios gratuitos de asistencia lingüística. Llame al [phone].    We comply with applicable federal civil rights laws and [place] laws. We do not discriminate on the basis of race, color, national origin, age, disability sex, sexual orientation or gender identity.            "

## 2017-08-16 NOTE — PROGRESS NOTES
Progress Note    Client Name: Antonieta Ferrari  Date: 8/16/2017         Service Type: Individual      Session Start Time: 4:00  Session End Time: 4:45      Session Length: 45     Session #: 17     Attendees: Client attended alone    Treatment Plan Last Reviewed: 8/16/2017  PHQ-9 / KENNEDY-7 : Each session     DATA      Progress Since Last Session (Related to Symptoms / Goals / Homework):   Symptoms: Stable    Homework: Partially completed      Episode of Care Goals: Satisfactory progress - ACTION (Actively working towards change); Intervened by reinforcing change plan / affirming steps taken     Current / Ongoing Stressors and Concerns:  Client was hired by [employer] for Membership Retention. Also has not smoked for 1 week b/c she can't afford it. But said she also is not craving it and is sleeping well. Thinks the lamital and seroquel are working.     Treatment Objective(s) Addressed in This Session:   attend and participate in social or recreational activities weekly.   Use tools in the evening to avoid using: yoga, hot bath/shower, be social, read, calming things.  Consider treatment program.  Med compliance.     Intervention:   Solution Focused: Follow guidelines for after care including AA        ASSESSMENT: Current Emotional / Mental Status (status of significant symptoms):   Risk status (Self / Other harm or suicidal ideation)   Client denies current fears or concerns for personal safety.   Client denies current or recent suicidal ideation or behaviors.   Client denies current or recent homicidal ideation or behaviors.   Client denies current or recent self injurious behavior or ideation.   Client denies other safety concerns.   A safety and risk management plan has not been developed at this time, however client was given the after-hours number should there be a change in any of these risk factors.     Appearance:   Appropriate    Eye Contact:   Fair     Psychomotor Behavior: Normal    Attitude:   Cooperative    Orientation:   All   Speech    Rate / Production: Normal     Volume:  Normal    Mood:    Normal   Affect:    Appropriate    Thought Content:  Clear    Thought Form:  Coherent  Logical    Insight:    Good      Medication Review:   No changes to current psychiatric medication(s) Taking lamictal. Uses seroquel for sleep sometimes.     Medication Compliance:   Yes     Changes in Health Issues:   None reported     Chemical Use Review:   Substance Use: Chemical use reviewed, no active concerns identified      Tobacco Use: No current tobacco use.       Collateral Reports Completed:   Not Applicable    PLAN: Same goals:   Continue to abstain. Start new job. Meet in the evenings.        Raven Colunga                                                         ________________________________________________________________________    Treatment Plan    Client's Name: Antonieta Ferrari  YOB: 1985    Date: 7/27/2017    DSM-V Diagnoses: 296.32 Major Depressive Disorder, Recurrent Episode, Moderate _ and With anxious distress or 300.02 (F41.1) Generalized Anxiety Disorder  Psychosocial / Contextual Factors: Cannibis dependence recovery  WHODAS: 30    Referral / Collaboration:  Referral to another professional/service is not indicated at this time.     Anticipated number of session or this episode of care: 10      MeasurableTreatment Goal(s) related to diagnosis / functional impairment(s)  Goal 1: Client will lower her GAD7 and PHQ9 scores to 4 or below    I will know I've met my goal when I'm not smoking pot daily.      Objective #A (Client Action)    Client will follow treatment after care guidelines.  Status: Restarted - Date: 7/27/2017     Intervention(s)  Therapist will encourage Antonieta to get and use a sponsor.    Objective #B  Client will stay sober.  Status: Restarted - Date: 7/27/2017     Intervention(s)  Therapist will encourage  sobriety..    Objective #C  Client will be social on a regular basis..  Status: Continued - Date(s):7/27/2017    Intervention(s)  Therapist will encourage Antonieta to work on increasing support..        Client has reviewed and agreed to the above plan.      Raven Colunga  July 27, 2017

## 2017-08-17 ASSESSMENT — ANXIETY QUESTIONNAIRES: GAD7 TOTAL SCORE: 2

## 2018-02-01 ENCOUNTER — FCC EXTENDED DOCUMENTATION (OUTPATIENT)
Dept: PSYCHOLOGY | Facility: CLINIC | Age: 33
End: 2018-02-01

## 2018-02-01 NOTE — PROGRESS NOTES
Discharge Summary  Multiple Sessions    Client Name: Antonieta Ferrari MRN#: [id number] YOB: 1985      Intake / Discharge Date: 2/1/2018      DSM5 Diagnoses: (Sustained by DSM5 Criteria Listed Above)  Diagnoses: 300.02 (F41.1) Generalized Anxiety Disorder  Psychosocial & Contextual Factors: Unemployed  WHODAS 2.0 (12 item) Score: 20          Presenting Concern:  Anxiety related to unemployment.      Reason for Discharge:  Client did not return      Disposition at Time of Last Encounter:   Comments:   N/A     Risk Management:   Client denies a history of suicidal ideation, suicide attempts, self-injurious behavior, homicidal ideation, homicidal behavior and and other safety concerns  A safety and risk management plan has not been developed at this time, however client was given the after-hours number / 911 should there be a change in any of these risk factors.      Referred To:  N/A        Raven Colunga   2/1/2018

## 2018-07-16 ENCOUNTER — OFFICE VISIT (OUTPATIENT)
Dept: URGENT CARE | Facility: URGENT CARE | Age: 33
End: 2018-07-16
Payer: COMMERCIAL

## 2018-07-16 VITALS
SYSTOLIC BLOOD PRESSURE: 102 MMHG | TEMPERATURE: 98.4 F | DIASTOLIC BLOOD PRESSURE: 66 MMHG | OXYGEN SATURATION: 99 % | WEIGHT: 147 LBS | BODY MASS INDEX: 25.23 KG/M2 | HEART RATE: 59 BPM

## 2018-07-16 DIAGNOSIS — M62.830 BACK MUSCLE SPASM: Primary | ICD-10-CM

## 2018-07-16 PROCEDURE — 99213 OFFICE O/P EST LOW 20 MIN: CPT | Performed by: PHYSICIAN ASSISTANT

## 2018-07-16 RX ORDER — CYCLOBENZAPRINE HCL 10 MG
10 TABLET ORAL 3 TIMES DAILY PRN
Qty: 20 TABLET | Refills: 0 | Status: SHIPPED | OUTPATIENT
Start: 2018-07-16 | End: 2018-07-21

## 2018-07-16 RX ORDER — LAMOTRIGINE 200 MG/1
TABLET ORAL
COMMUNITY
Start: 2018-02-10

## 2018-07-16 NOTE — MR AVS SNAPSHOT
After Visit Summary   7/16/2018    Antonieta Ferrari    MRN: [id number]           Patient Information     Date Of Birth          1985        Visit Information        Provider Department      7/16/2018 10:10 AM Ivy Ryan PA-C [facility]        Today's Diagnoses     Back muscle spasm    -  1      Care Instructions      Back Spasm (No Trauma)  Spasm of the back muscles can occur after a sudden forceful twisting or bending force (such as in a car accident), after a simple awkward movement, or after lifting something heavy with poor body positioning. In any case, muscle spasm adds to the pain. Sleeping in an awkward position or on a poor quality mattress can also cause this. Some people respond to emotional stress by tensing the muscles of their back.  Pain that continues may need further evaluation or other types of treatment such as physical therapy.  You don't always need X-rays for the initial evaluation of back pain, unless you had a physical injury such as from a car accident or fall. If your pain continues and doesn't respond to medical treatment, X-rays and other tests may then be done.   Home care    As soon as possible, start sitting or walking again to avoid problems from prolonged bed rest (muscle weakness, worsening back stiffness and pain, blood clots in the legs).    When in bed, try to find a position of comfort. A firm mattress is best. Try lying flat on your back with pillows under your knees. You can also try lying on your side with your knees bent up toward your chest and a pillow between your knees.    Avoid prolonged sitting, long car rides, or travel. This puts more stress on the lower back than standing or walking.     During the first 24 to 72 hours after an injury or flare-up, apply an ice pack to the painful area for 20 minutes, then remove it for 20 minutes. Do this over a period of 60 to 90 minutes or several times a day. This will reduce swelling  and pain. Always wrap ice packs in a thin towel.    You can start with ice, then switch to heat. Heat (hot shower, hot bath, or heating pad) reduces pain, and works well for muscle spasms. Apply heat to the painful area for 20 minutes, then remove it for 20 minutes. Do this over a period of 60 to 90 minutes or several times a day. Do not sleep on a heating pad as it can burn or damage skin.    Alternate ice and heat therapies.    Be aware of safe lifting methods and do not lift anything over 15 pounds until all the pain is gone.  Gentle stretching will help your back heal faster. Do this simple routine 2 to 3 times a day until your back is feeling better.    Lie on your back with your knees bent and both feet on the ground    Slowly raise your left knee to your chest as you flatten your lower back against the floor. Hold for 20 to 30 seconds.    Relax and repeat the exercise with your right knee.    Do 2 to 3 of these exercises for each leg.    Repeat, hugging both knees to your chest at the same time.    Do not bounce, but use a gentle pull.  Medicines  Talk to your doctor before using medicine, especially if you have other medical problems or are taking other medicines.  You may use acetaminophen or ibuprofen to control pain, unless your healthcare provider prescribed another pain medicine. If you have a chronic condition such as diabetes, liver or kidney disease, stomach ulcer, or gastrointestinal bleeding, or are taking blood thinners, talk with your healthcare provider before taking any medicines.  Be careful if you are given prescription pain medicine, narcotics, or medicine for muscle spasm. They can cause drowsiness, affect your coordination, reflexes, or judgment. Do not drive or operate heavy machinery when taking these medicines. Take pain medicine only as prescribed by your healthcare provider.  Follow-up care  Follow up with your doctor, or as advised. Physical therapy or further tests may be  needed.  If X-rays were taken, they may be reviewed by a radiologist. You will be notified of any new findings that may affect your care.  Call 911  Call 911 if any of these occur:    Trouble breathing    Confusion    Drowsiness or trouble awakening    Fainting or loss of consciousness    Rapid or very slow heart rate    Loss of bowel or bladder control  When to seek medical advice  Call your healthcare provider right away if any of these occur:    Pain becomes worse or spreads to your legs    Weakness or numbness in one or both legs    Numbness in the groin or genital area    Fever of 100.4 F (38 C) or higher, or as directed by your healthcare provider    Burning or pain when passing urine  Date Last Reviewed: 6/1/2016 2000-2017 [employer]. [address] [place] [address], [place] [address]. All rights reserved. This information is not intended as a substitute for professional medical care. Always follow your healthcare professional's instructions.                Follow-ups after your visit        Who to contact     If you have questions or need follow up information about today's clinic visit or your schedule please contact [facility] directly at [phone].  Normal or non-critical lab and imaging results will be communicated to you by [employer], letter or phone within 4 business days after the clinic has received the results. If you do not hear from us within 7 days, please contact the clinic through [employer] or phone. If you have a critical or abnormal lab result, we will notify you by phone as soon as possible.  Submit refill requests through [employer] or call your pharmacy and they will forward the refill request to us. Please allow 3 business days for your refill to be completed.          Additional Information About Your Visit        [employer] Information     [employer] lets you send messages to your doctor, view your test results, renew your prescriptions, schedule appointments and more. To  "sign up, go to [email or web address] . Click on \"Log in\" on the left side of the screen, which will take you to the Welcome page. Then click on \"Sign up Now\" on the right side of the page.     You will be asked to enter the access code listed below, as well as some personal information. Please follow the directions to create your username and password.     Your access code is: [email or web address]  Expires: 10/14/2018 10:47 AM     Your access code will  in 90 days. If you need help or a new code, please call your [facility] clinic or [phone].        Care EveryWhere ID     This is your Care EveryWhere ID. This could be used by other organizations to access your [facility] medical records  [id number]        Your Vitals Were     Pulse Temperature Pulse Oximetry BMI (Body Mass Index)          59 98.4  F (36.9  C) (Tympanic) 99% 25.23 kg/m2         Blood Pressure from Last 3 Encounters:   18 102/66   16 132/76   16 128/78    Weight from Last 3 Encounters:   18 147 lb (66.7 kg)   16 163 lb (73.9 kg)   16 165 lb (74.8 kg)              Today, you had the following     No orders found for display         Today's Medication Changes          These changes are accurate as of 18 10:48 AM.  If you have any questions, ask your nurse or doctor.               Start taking these medicines.        Dose/Directions    cyclobenzaprine 10 MG tablet   Commonly known as:  FLEXERIL   Used for:  Back muscle spasm   Started by:  Ivy Ryan PA-C        Dose:  10 mg   Take 1 tablet (10 mg) by mouth 3 times daily as needed for muscle spasms   Quantity:  20 tablet   Refills:  0            Where to get your medicines      These medications were sent to [facility] [address] AT [facility]   [address] [address]     Phone:  [phone]     cyclobenzaprine 10 MG tablet                Primary Care Provider Fax #    Physician No " Ref-Primary [phone]       No address on file        Equal Access to Services     LONI TEIXEIRAANDRIY : Hadii alyssa solis tricia Blanton, wadivinada georgeamy, rafa vitale, jamarcus guillermoin hayaaporsche spiveykylah barger laaylaporsche kael. So [facility] [phone].    ATENCIÓN: Si habla español, tiene a maciel disposición servicios gratuitos de asistencia lingüística. Llame al [phone].    We comply with applicable federal civil rights laws and [place] laws. We do not discriminate on the basis of race, color, national origin, age, disability, sex, sexual orientation, or gender identity.            Thank you!     Thank you for choosing [facility]  for your care. Our goal is always to provide you with excellent care. Hearing back from our patients is one way we can continue to improve our services. Please take a few minutes to complete the written survey that you may receive in the mail after your visit with us. Thank you!             Your Updated Medication List - Protect others around you: Learn how to safely use, store and throw away your medicines at www.disposemymeds.org.          This list is accurate as of 7/16/18 10:48 AM.  Always use your most recent med list.                   Brand Name Dispense Instructions for use Diagnosis    albuterol 108 (90 Base) MCG/ACT Inhaler    PROAIR HFA/PROVENTIL HFA/VENTOLIN HFA    1 Inhaler    Inhale 2 puffs into the lungs every 4 hours as needed for shortness of breath / dyspnea or wheezing    Intermittent asthma, uncomplicated       cyclobenzaprine 10 MG tablet    FLEXERIL    20 tablet    Take 1 tablet (10 mg) by mouth 3 times daily as needed for muscle spasms    Back muscle spasm       estradiol cypionate 5 MG/ML injection    DEPO-ESTRADIOL     Inject into the muscle every 28 days        fluticasone 50 MCG/ACT spray    FLONASE    16 g    Spray 2 sprays into both nostrils daily    Allergic rhinitis, unspecified allergic rhinitis type       lamoTRIgine 200 MG tablet    LaMICtal      TK 1 T PO QAM        LEXAPRO PO           montelukast 10 MG tablet    SINGULAIR    30 tablet    Take 1 tablet (10 mg) by mouth At Bedtime USE ONCE DAILY DURING PEAK ALLERGY SEASON(S)    Intermittent asthma, uncomplicated       REMERON PO           SEROQUEL PO           VYVANSE PO

## 2018-07-16 NOTE — LETTER
[facility]  [address]  [address]  [place] [address]  Phone: [phone]  Fax: [phone]    July 16, 2018        Antonieta Ferrari  [address]  [facility] [place] [facility] [address]          To whom it may concern:    RE: Antonieta Ferrari    Patient was seen and treated today at our clinic and missed work. Please excuse from work on 7/16/2018.     Please contact me for questions or concerns.      Sincerely,        Ivy Ryan PA-C

## 2018-07-16 NOTE — PATIENT INSTRUCTIONS
Back Spasm (No Trauma)  Spasm of the back muscles can occur after a sudden forceful twisting or bending force (such as in a car accident), after a simple awkward movement, or after lifting something heavy with poor body positioning. In any case, muscle spasm adds to the pain. Sleeping in an awkward position or on a poor quality mattress can also cause this. Some people respond to emotional stress by tensing the muscles of their back.  Pain that continues may need further evaluation or other types of treatment such as physical therapy.  You don't always need X-rays for the initial evaluation of back pain, unless you had a physical injury such as from a car accident or fall. If your pain continues and doesn't respond to medical treatment, X-rays and other tests may then be done.   Home care    As soon as possible, start sitting or walking again to avoid problems from prolonged bed rest (muscle weakness, worsening back stiffness and pain, blood clots in the legs).    When in bed, try to find a position of comfort. A firm mattress is best. Try lying flat on your back with pillows under your knees. You can also try lying on your side with your knees bent up toward your chest and a pillow between your knees.    Avoid prolonged sitting, long car rides, or travel. This puts more stress on the lower back than standing or walking.     During the first 24 to 72 hours after an injury or flare-up, apply an ice pack to the painful area for 20 minutes, then remove it for 20 minutes. Do this over a period of 60 to 90 minutes or several times a day. This will reduce swelling and pain. Always wrap ice packs in a thin towel.    You can start with ice, then switch to heat. Heat (hot shower, hot bath, or heating pad) reduces pain, and works well for muscle spasms. Apply heat to the painful area for 20 minutes, then remove it for 20 minutes. Do this over a period of 60 to 90 minutes or several times a day. Do not sleep on a heating pad  as it can burn or damage skin.    Alternate ice and heat therapies.    Be aware of safe lifting methods and do not lift anything over 15 pounds until all the pain is gone.  Gentle stretching will help your back heal faster. Do this simple routine 2 to 3 times a day until your back is feeling better.    Lie on your back with your knees bent and both feet on the ground    Slowly raise your left knee to your chest as you flatten your lower back against the floor. Hold for 20 to 30 seconds.    Relax and repeat the exercise with your right knee.    Do 2 to 3 of these exercises for each leg.    Repeat, hugging both knees to your chest at the same time.    Do not bounce, but use a gentle pull.  Medicines  Talk to your doctor before using medicine, especially if you have other medical problems or are taking other medicines.  You may use acetaminophen or ibuprofen to control pain, unless your healthcare provider prescribed another pain medicine. If you have a chronic condition such as diabetes, liver or kidney disease, stomach ulcer, or gastrointestinal bleeding, or are taking blood thinners, talk with your healthcare provider before taking any medicines.  Be careful if you are given prescription pain medicine, narcotics, or medicine for muscle spasm. They can cause drowsiness, affect your coordination, reflexes, or judgment. Do not drive or operate heavy machinery when taking these medicines. Take pain medicine only as prescribed by your healthcare provider.  Follow-up care  Follow up with your doctor, or as advised. Physical therapy or further tests may be needed.  If X-rays were taken, they may be reviewed by a radiologist. You will be notified of any new findings that may affect your care.  Call 911  Call 911 if any of these occur:    Trouble breathing    Confusion    Drowsiness or trouble awakening    Fainting or loss of consciousness    Rapid or very slow heart rate    Loss of bowel or bladder control  When to seek  medical advice  Call your healthcare provider right away if any of these occur:    Pain becomes worse or spreads to your legs    Weakness or numbness in one or both legs    Numbness in the groin or genital area    Fever of 100.4 F (38 C) or higher, or as directed by your healthcare provider    Burning or pain when passing urine  Date Last Reviewed: 6/1/2016 2000-2017 [employer]. [address] [place] [address]. All rights reserved. This information is not intended as a substitute for professional medical care. Always follow your healthcare professional's instructions.

## 2018-07-16 NOTE — PROGRESS NOTES
L sided back pain  SUBJECTIVE  HPI: Antonieta Ferrari is a 33 year old female who presents for evaluation of back pain  Symptoms began this AM, have been onset acute and are stable.  Pain is located in the Left upper back region, with radiation to does not radiate. Recent injury:none recalled by the patient. Last night she was bent over roasting marshmallows.   Personal hx of back pain is recurrent self limited episodes of low back pain in the past.  Pain is exacerbated by: deep breath, pressure and movement.  Pain is relieved by: none[unfilled] sx include: denies, fecal incontinence (Direct patient to the ER), lower extremity numbness, tingling and urinary incontinence (Diret patient to the ER).  Red flag symptoms: negative for, fever, chills, numbness or tingling    Past Medical History:   Diagnosis Date     Alcohol use disorder, severe, dependence (H)      Cannabis use disorder, severe, dependence (H)      Chronic mental illness      Intermittent asthma     triggers include exercise:  URI, exercise, fall allergies     Current Outpatient Prescriptions   Medication Sig Dispense Refill     cyclobenzaprine (FLEXERIL) 10 MG tablet Take 1 tablet (10 mg) by mouth 3 times daily as needed for muscle spasms 20 tablet 0     estradiol cypionate (DEPO-ESTRADIOL) 5 MG/ML injection Inject into the muscle every 28 days       lamoTRIgine (LAMICTAL) 200 MG tablet TK 1 T PO QAM       Lisdexamfetamine Dimesylate (VYVANSE PO)        albuterol (PROAIR HFA, PROVENTIL HFA, VENTOLIN HFA) 108 (90 BASE) MCG/ACT inhaler Inhale 2 puffs into the lungs every 4 hours as needed for shortness of breath / dyspnea or wheezing 1 Inhaler 5     Escitalopram Oxalate (LEXAPRO PO)        fluticasone (FLONASE) 50 MCG/ACT nasal spray Spray 2 sprays into both nostrils daily 16 g 1     Mirtazapine (REMERON PO)        montelukast (SINGULAIR) 10 MG tablet Take 1 tablet (10 mg) by mouth At Bedtime USE ONCE DAILY DURING PEAK ALLERGY SEASON(S) (Patient not taking:  Reported on 7/16/2018) 30 tablet 5     QUEtiapine Fumarate (SEROQUEL PO)        Social History   Substance Use Topics     Smoking status: Never Smoker     Smokeless tobacco: Never Used     Alcohol use No       ROS:  Review of systems negative except as stated above.    OBJECTIVE:  /66 (BP Location: Right arm, Patient Position: Chair, Cuff Size: Adult Regular)  Pulse 59  Temp 98.4  F (36.9  C) (Tympanic)  Wt 147 lb (66.7 kg)  SpO2 99%  BMI 25.23 kg/m2  Back examination: Back symmetric, no curvature. ROM normal. No CVA tenderness. TTP on Left side mid back. NO midline tenderness.   GENERAL APPEARANCE: healthy, alert and no distress  RESP: lungs clear to auscultation - no rales, rhonchi or wheezes  CV: regular rates and rhythm, normal S1 S2, no murmur noted  NEURO: Normal strength and tone with no weakness or sensory deficit noted, reflexes normal   SKIN: no suspicious lesions or rashes    ASSESSMENT / PLAN:  1. Back muscle spasm      1.  Continue stretching and strengthening exercises.       2.  Continue prn heat or ice application.  -800mg THREE TIMES PER DAY   - cyclobenzaprine (FLEXERIL) 10 MG tablet; Take 1 tablet (10 mg) by mouth 3 times daily as needed for muscle spasms  Dispense: 20 tablet; Refill: 0    Ivy Ryan PA-C

## 2023-03-23 NOTE — PROGRESS NOTES
Progress Note    Client Name: Antonieta Ferrari  Date: 6/27/2017         Service Type: Individual      Session Start Time: 4:00  Session End Time: 4:45      Session Length: 45     Session #: 15     Attendees: Client attended alone    Treatment Plan Last Reviewed: 4/13/2017  PHQ-9 / KENNEDY-7 : Each session     DATA      Progress Since Last Session (Related to Symptoms / Goals / Homework):   Symptoms: Stable    Homework: Partially completed      Episode of Care Goals: Minimal progress - PREPARATION (Decided to change - considering how); Intervened by negotiating a change plan and determining options / strategies for behavior change, identifying triggers, exploring social supports, and working towards setting a date to begin behavior change     Current / Ongoing Stressors and Concerns:  Client recovering well from knee surgery. Off crutches and back to moderate exercise.Still smoking with increased use. Has created many rules around smoking. Talked about her life being run by her marijuana use. Wants friends but has to smoke around them and it doesn't go well. Doesn't want to give up smoking. Also talking to her mother again. Thinking about moving to [place]. Encouraged her to think seriously about being closer to parents. Also encouraged treatment again. No job opportunities yet.       Treatment Objective(s) Addressed in This Session:   attend and participate in social or recreational activities weekly.   Use tools in the evening to avoid using: yoga, hot bath/shower, be social, read, calming things.  Consider treatment program.  Med compliance.     Intervention:   Solution Focused: Follow guidelines for after care including AA        ASSESSMENT: Current Emotional / Mental Status (status of significant symptoms):   Risk status (Self / Other harm or suicidal ideation)   Client denies current fears or concerns for personal safety.   Client denies current or recent suicidal ideation or  behaviors.   Client denies current or recent homicidal ideation or behaviors.   Client denies current or recent self injurious behavior or ideation.   Client denies other safety concerns.   A safety and risk management plan has not been developed at this time, however client was given the after-hours number should there be a change in any of these risk factors.     Appearance:   Appropriate    Eye Contact:   Fair    Psychomotor Behavior: Normal    Attitude:   Cooperative    Orientation:   All   Speech    Rate / Production: Normal     Volume:  Normal    Mood:    Normal   Affect:    Appropriate    Thought Content:  Clear    Thought Form:  Coherent  Logical    Insight:    Good      Medication Review:   Changes to psychiatric medications, see updated Medication List in EPIC.  Started taking lamictal. Uses seroquel for sleep sometimes.     Medication Compliance:   Yes     Changes in Health Issues:   None reported     Chemical Use Review:   Substance Use: Chemical use reviewed, no active concerns identified      Tobacco Use: No current tobacco use.       Collateral Reports Completed:   Not Applicable    PLAN: Same goals:   Consider moving to [place]. Reduce use.  Use tools in the evening to avoid using: yoga, hot bath/shower, be social, read, calming things. Continue job search. Seek out social outlets.      Raven Colunga                                                         ________________________________________________________________________    Treatment Plan    Client's Name: Antonieta Ferrari  YOB: 1985    Date: 4/13/2017    DSM-V Diagnoses: 296.32 Major Depressive Disorder, Recurrent Episode, Moderate _ and With anxious distress or 300.02 (F41.1) Generalized Anxiety Disorder  Psychosocial / Contextual Factors: Cannibis dependence recovery  WHODAS: 30    Referral / Collaboration:  Referral to another professional/service is not indicated at this time.     Anticipated number of session or this episode  of care: 10      MeasurableTreatment Goal(s) related to diagnosis / functional impairment(s)  Goal 1: Client will lower her GAD7 and PHQ9 scores to 4 or below    I will know I've met my goal when I'm not smoking pot daily.      Objective #A (Client Action)    Client will follow treatment after care guidelines.  Status: Restarted - Date: 4/13/2017     Intervention(s)  Therapist will make referrals to Outpatient [facility] and encourage Antonieta to get and use a sponsor.    Objective #B  Client will stay sober.  Status: Restarted - Date: 4/13/2017     Intervention(s)  Therapist will encourage sobriety..    Objective #C  Client will be social on a regular basis..  Status: New - Date: 4/13/2017    Intervention(s)  Therapist will encourage Antonieta to work on increasing support..        Client has reviewed and agreed to the above plan.      Raven Colunga  April 13, 2017   Rituxan Pregnancy And Lactation Text: This medication is Pregnancy Category C and it isn't know if it is safe during pregnancy. It is unknown if this medication is excreted in breast milk but similar antibodies are known to be excreted.